# Patient Record
Sex: FEMALE | Race: WHITE | URBAN - METROPOLITAN AREA
[De-identification: names, ages, dates, MRNs, and addresses within clinical notes are randomized per-mention and may not be internally consistent; named-entity substitution may affect disease eponyms.]

---

## 2022-07-01 ENCOUNTER — OFFICE VISIT (OUTPATIENT)
Dept: URBAN - METROPOLITAN AREA CLINIC 5 | Facility: CLINIC | Age: 67
End: 2022-07-01
Payer: MEDICARE

## 2022-07-01 DIAGNOSIS — H25.813 COMBINED FORMS OF AGE-RELATED CATARACT, BILATERAL: Primary | ICD-10-CM

## 2022-07-01 DIAGNOSIS — H40.1132 PRIMARY OPEN-ANGLE GLAUCOMA, MODERATE STAGE, BILATERAL: ICD-10-CM

## 2022-07-01 DIAGNOSIS — H43.393 OTHER VITREOUS OPACITIES, BILATERAL: ICD-10-CM

## 2022-07-01 PROCEDURE — 92014 COMPRE OPH EXAM EST PT 1/>: CPT | Performed by: OPTOMETRIST

## 2022-07-01 RX ORDER — CALCIUM CITRATE 200(950)MG
TABLET ORAL
Refills: 0 | Status: ACTIVE
Start: 2022-07-01

## 2022-07-01 RX ORDER — MONTELUKAST SODIUM 10 MG/1
10 MG TABLET ORAL
Qty: 0 | Refills: 0 | Status: ACTIVE
Start: 2022-07-01

## 2022-07-01 RX ORDER — ASPIRIN 81 MG/1
81 MG TABLET, CHEWABLE ORAL
Qty: 0 | Refills: 0 | Status: ACTIVE
Start: 2022-07-01

## 2022-07-01 RX ORDER — SERTRALINE HYDROCHLORIDE 25 MG/1
25 MG TABLET, FILM COATED ORAL
Qty: 0 | Refills: 0 | Status: ACTIVE
Start: 2022-07-01

## 2022-07-01 RX ORDER — LETROZOLE 2.5 MG/1
2.5 MG TABLET, FILM COATED ORAL
Qty: 0 | Refills: 0 | Status: ACTIVE
Start: 2022-07-01

## 2022-07-01 RX ORDER — ATORVASTATIN CALCIUM 80 MG/1
80 MG TABLET, FILM COATED ORAL
Qty: 0 | Refills: 0 | Status: ACTIVE
Start: 2022-07-01

## 2022-07-01 RX ORDER — ALBUTEROL SULFATE 90 UG/1
AEROSOL, METERED RESPIRATORY (INHALATION)
Refills: 0 | Status: ACTIVE
Start: 2022-07-01

## 2022-07-01 RX ORDER — DM/PE/ACETAMINOPHEN/CHLORPHENR 10-5-325-2
TABLET, SEQUENTIAL ORAL
Refills: 0 | Status: ACTIVE
Start: 2022-07-01

## 2022-07-01 RX ORDER — LEVOTHYROXINE SODIUM 75 UG/1
TABLET ORAL
Qty: 0 | Refills: 0 | Status: ACTIVE
Start: 2022-07-01

## 2022-07-01 ASSESSMENT — INTRAOCULAR PRESSURE
OS: 17
OD: 16

## 2022-07-01 NOTE — IMPRESSION/PLAN
Impression: Primary open-angle glaucoma, moderate stage, bilateral: H40.9572. Plan: Recommend iStent at the time of cataract surgery. Reviewed the r/b/a and the patient wishes to proceed. Discussed that while there is a chance of reducing or eliminating glaucoma drops, there is no guarantee of that. However, doing so would greatly benefit the patient by reducing the risk of ocular surface issues such as dryness and epitheliopathy, as well as removing concerns about compliance and reducing the cost of purchasing drops. Also discussed that there is a chance that the iStent will not be able to be placed due to anatomy or other issues. All questions answered.   Plan for pt to return to the care of Dr. Clementina Kent after CE.

## 2022-07-01 NOTE — IMPRESSION/PLAN
Impression: Open angle with borderline findings, low risk, bilateral: H40.013. Plan: Continue care with Dr. Jayce Rodgers.

## 2022-07-01 NOTE — IMPRESSION/PLAN
Impression: Combined forms of age-related cataract, bilateral: H25.813. Plan: Cataracts both eyes (H25.813) - plan to perform cataract surgery in both eyes with the right eye first: Discussed the risks, benefits, and alternatives of cataract surgery. Given that we almost never use retrobulbar anesthesia, there is typically no need to stop any anticoagulant medications when a patient gets cataract surgery with us. In most cataract surgeries performed by us we place an antibiotic and steroid at the time of surgery so most likely will not need to use any prescription post-op drops. The patient stated a full understanding and a desire to proceed with the procedure. Biometry ordered for intraocular lens selection. Advised against driving until complete. Reviewed the increased risk of retinal detachment after cataract surgery and reviewed retinal detachment and general warnings and to contact us immediately should any of those develop. Surgical recommendations: No contraindications for toric or MF IOL.

## 2022-07-22 ENCOUNTER — TESTING ONLY (OUTPATIENT)
Dept: URBAN - METROPOLITAN AREA CLINIC 6 | Facility: CLINIC | Age: 67
End: 2022-07-22
Payer: MEDICARE

## 2022-07-22 DIAGNOSIS — H25.813 COMBINED FORMS OF AGE-RELATED CATARACT, BILATERAL: Primary | ICD-10-CM

## 2022-08-05 ENCOUNTER — POST-OPERATIVE VISIT (OUTPATIENT)
Dept: URBAN - METROPOLITAN AREA CLINIC 5 | Facility: CLINIC | Age: 67
End: 2022-08-05
Payer: MEDICARE

## 2022-08-05 DIAGNOSIS — Z48.810 ENCOUNTER FOR SURGICAL AFTERCARE FOLLOWING SURGERY ON A SENSE ORGAN: Primary | ICD-10-CM

## 2022-08-05 PROCEDURE — 99024 POSTOP FOLLOW-UP VISIT: CPT | Performed by: OPTOMETRIST

## 2022-08-05 ASSESSMENT — INTRAOCULAR PRESSURE
OD: 25
OS: 20

## 2022-08-05 NOTE — IMPRESSION/PLAN
Impression: S/P Cataract Extraction by phacoemulsification with IOL placement OD - . Encounter for surgical aftercare following surgery on a sense organ  Z48.810. Plan: S/P Cataract extraction right eye with placement of intraocular Dexycu or subconjunctival Kenalog and moxifloxacin intracamerally - post-op day #0-1 - Advised patient that likely will see floaters for 1-2 weeks. Advised no heavy lifting or strenuous activity for at least one week and no swimming for at least three weeks. Use eye shield at night for one week. Patient advised to call immediately for any problems including, but not limited to, pain, redness, increase in floaters, flashing lights, changes in peripheral vision, decreased vision, and/or any other problems or concerns. Patient stated an understanding of all the instructions. Follow-up early next week to recheck IOP. Use Combigan (sample given) BID OD.

## 2022-08-09 ENCOUNTER — POST-OPERATIVE VISIT (OUTPATIENT)
Dept: URBAN - METROPOLITAN AREA CLINIC 5 | Facility: CLINIC | Age: 67
End: 2022-08-09
Payer: MEDICARE

## 2022-08-09 DIAGNOSIS — Z48.810 ENCOUNTER FOR SURGICAL AFTERCARE FOLLOWING SURGERY ON A SENSE ORGAN: Primary | ICD-10-CM

## 2022-08-09 PROCEDURE — 99024 POSTOP FOLLOW-UP VISIT: CPT | Performed by: OPTOMETRIST

## 2022-08-09 ASSESSMENT — INTRAOCULAR PRESSURE
OD: 14
OS: 17

## 2022-08-09 NOTE — IMPRESSION/PLAN
Impression: S/P CE/Standard IOL OD - 4 Days. Encounter for surgical aftercare following surgery on a sense organ  Z48.810. Plan: s/p cataract surgery right eye with placement of intraocular Dexycu or subconjunctival Kenalogand moxifloxacin intracamerally. Healing well. Advised patient to avoid swimming for at least 2 more weeks. Advised to call immediately for any problems or concerns including, but not limited to, pain, redness, changes in peripheral vision and/or decreased vision. The patient stated an understanding of the above. Continue combigan BID until it runs out. Return as scheduled for other eye surgery.

## 2022-08-12 ENCOUNTER — POST-OPERATIVE VISIT (OUTPATIENT)
Dept: URBAN - METROPOLITAN AREA CLINIC 5 | Facility: CLINIC | Age: 67
End: 2022-08-12
Payer: MEDICARE

## 2022-08-12 DIAGNOSIS — Z96.1 PRESENCE OF INTRAOCULAR LENS: Primary | ICD-10-CM

## 2022-08-12 PROCEDURE — 99024 POSTOP FOLLOW-UP VISIT: CPT | Performed by: OPTOMETRIST

## 2022-08-12 ASSESSMENT — INTRAOCULAR PRESSURE
OD: 16
OS: 14

## 2022-08-12 NOTE — IMPRESSION/PLAN
Impression: S/P CE/Standard IOL OS - . Presence of intraocular lens  Z96.1. Plan: S/P Cataract extraction left eye with placement of intraocular Dexycu or subconjunctival Kenalog and moxifloxacin intracamerally - post-op day #0-1 - Advised patient that likely will see floaters for 1-2 weeks. Advised no heavy lifting or strenuous activity for at least one week and no swimming for at least three weeks. Use eye shield at night for one week. Patient advised to call immediately for any problems including, but not limited to, pain, redness, increase in floaters, flashing lights, changes in peripheral vision, decreased vision, and/or any other problems or concerns. Patient stated an understanding of all the instructions. Follow-up in approximately one week / prn.

## 2022-08-19 ENCOUNTER — POST-OPERATIVE VISIT (OUTPATIENT)
Dept: URBAN - METROPOLITAN AREA CLINIC 5 | Facility: CLINIC | Age: 67
End: 2022-08-19
Payer: MEDICARE

## 2022-08-19 DIAGNOSIS — Z96.1 PRESENCE OF INTRAOCULAR LENS: Primary | ICD-10-CM

## 2022-08-19 PROCEDURE — 99024 POSTOP FOLLOW-UP VISIT: CPT | Performed by: OPTOMETRIST

## 2022-08-19 ASSESSMENT — INTRAOCULAR PRESSURE
OD: 17
OS: 22

## 2022-08-19 NOTE — IMPRESSION/PLAN
Impression: S/P CE/Standard IOL OS - 7 Days. Presence of intraocular lens  Z96.1. Plan: s/p cataract surgery left eye with placement of intraocular Dexycu or subconjunctival Kenalog and moxifloxacin intracamerally. Healing well. Advised patient to avoid swimming for at least 2 more weeks. Advised to call immediately for any problems or concerns including, but not limited to, pain, redness, changes in peripheral vision and/or decreased vision. The patient stated an understanding of the above. RTC in 3 months for dilated examination. No need of medicated drops.

## 2022-11-16 ENCOUNTER — OFFICE VISIT (OUTPATIENT)
Dept: URBAN - METROPOLITAN AREA CLINIC 5 | Facility: CLINIC | Age: 67
End: 2022-11-16
Payer: MEDICARE

## 2022-11-16 DIAGNOSIS — H43.393 OTHER VITREOUS OPACITIES, BILATERAL: Primary | ICD-10-CM

## 2022-11-16 DIAGNOSIS — H16.223 KERATOCONJUNCTIVITIS SICCA, BILATERAL: ICD-10-CM

## 2022-11-16 DIAGNOSIS — Z96.1 PRESENCE OF INTRAOCULAR LENS: ICD-10-CM

## 2022-11-16 DIAGNOSIS — H40.1132 PRIMARY OPEN-ANGLE GLAUCOMA, MODERATE STAGE, BILATERAL: ICD-10-CM

## 2022-11-16 PROCEDURE — 92014 COMPRE OPH EXAM EST PT 1/>: CPT | Performed by: OPTOMETRIST

## 2022-11-16 ASSESSMENT — INTRAOCULAR PRESSURE
OS: 16
OD: 16

## 2022-11-16 NOTE — IMPRESSION/PLAN
Impression: Keratoconjunctivitis sicca, bilateral: O41.543. Plan: Dry eyes - Recommend use of high quality artificial tears 3-4x per day prn.

## 2022-11-16 NOTE — IMPRESSION/PLAN
Impression: Primary open-angle glaucoma, moderate stage, bilateral: H40.4912. Plan: S/P Istent OU. IOP low / stable at this time. observe without IOP drops at this time. Patient under the care of Dr Clementina Kent.